# Patient Record
Sex: MALE | Race: WHITE | Employment: OTHER | URBAN - METROPOLITAN AREA
[De-identification: names, ages, dates, MRNs, and addresses within clinical notes are randomized per-mention and may not be internally consistent; named-entity substitution may affect disease eponyms.]

---

## 2023-05-09 RX ORDER — LYSINE 500 MG
TABLET ORAL
COMMUNITY

## 2023-05-09 RX ORDER — HYDROXYCHLOROQUINE SULFATE 200 MG/1
200 TABLET, FILM COATED ORAL DAILY
COMMUNITY
Start: 2023-04-04

## 2023-05-09 RX ORDER — LANOLIN ALCOHOL/MO/W.PET/CERES
400 CREAM (GRAM) TOPICAL 2 TIMES DAILY
COMMUNITY
Start: 2022-10-09

## 2023-05-18 ENCOUNTER — HOSPITAL ENCOUNTER (OUTPATIENT)
Age: 73
Setting detail: OBSERVATION
Discharge: HOME | End: 2023-05-19
Attending: UROLOGY | Admitting: UROLOGY
Payer: MEDICARE

## 2023-05-18 ENCOUNTER — ANESTHESIA (OUTPATIENT)
Dept: OPERATING ROOM | Age: 73
End: 2023-05-18
Payer: MEDICARE

## 2023-05-18 ENCOUNTER — ANESTHESIA EVENT (OUTPATIENT)
Dept: OPERATING ROOM | Age: 73
End: 2023-05-18
Payer: MEDICARE

## 2023-05-18 DIAGNOSIS — R33.9 RETENTION OF URINE, UNSPECIFIED: ICD-10-CM

## 2023-05-18 PROBLEM — N40.1 BPH WITH OBSTRUCTION/LOWER URINARY TRACT SYMPTOMS: Status: ACTIVE | Noted: 2023-05-18

## 2023-05-18 PROBLEM — N13.8 BPH WITH OBSTRUCTION/LOWER URINARY TRACT SYMPTOMS: Status: ACTIVE | Noted: 2023-05-18

## 2023-05-18 PROCEDURE — 2709999900 HC NON-CHARGEABLE SUPPLY: Performed by: UROLOGY

## 2023-05-18 PROCEDURE — 3600000004 HC SURGERY LEVEL 4 BASE: Performed by: UROLOGY

## 2023-05-18 PROCEDURE — 88305 TISSUE EXAM BY PATHOLOGIST: CPT

## 2023-05-18 PROCEDURE — G0378 HOSPITAL OBSERVATION PER HR: HCPCS

## 2023-05-18 PROCEDURE — 3700000001 HC ADD 15 MINUTES (ANESTHESIA): Performed by: UROLOGY

## 2023-05-18 PROCEDURE — 7100000001 HC PACU RECOVERY - ADDTL 15 MIN: Performed by: UROLOGY

## 2023-05-18 PROCEDURE — 2720000010 HC SURG SUPPLY STERILE: Performed by: UROLOGY

## 2023-05-18 PROCEDURE — 2580000003 HC RX 258: Performed by: ANESTHESIOLOGY

## 2023-05-18 PROCEDURE — 2580000003 HC RX 258: Performed by: UROLOGY

## 2023-05-18 PROCEDURE — A4217 STERILE WATER/SALINE, 500 ML: HCPCS | Performed by: UROLOGY

## 2023-05-18 PROCEDURE — 6370000000 HC RX 637 (ALT 250 FOR IP): Performed by: UROLOGY

## 2023-05-18 PROCEDURE — 3600000014 HC SURGERY LEVEL 4 ADDTL 15MIN: Performed by: UROLOGY

## 2023-05-18 PROCEDURE — 6360000002 HC RX W HCPCS: Performed by: UROLOGY

## 2023-05-18 PROCEDURE — 7100000000 HC PACU RECOVERY - FIRST 15 MIN: Performed by: UROLOGY

## 2023-05-18 PROCEDURE — 3700000000 HC ANESTHESIA ATTENDED CARE: Performed by: UROLOGY

## 2023-05-18 PROCEDURE — 6360000002 HC RX W HCPCS: Performed by: NURSE ANESTHETIST, CERTIFIED REGISTERED

## 2023-05-18 RX ORDER — MIDAZOLAM HYDROCHLORIDE 1 MG/ML
2 INJECTION INTRAMUSCULAR; INTRAVENOUS
Status: DISCONTINUED | OUTPATIENT
Start: 2023-05-18 | End: 2023-05-18 | Stop reason: HOSPADM

## 2023-05-18 RX ORDER — SODIUM CHLORIDE, SODIUM LACTATE, POTASSIUM CHLORIDE, CALCIUM CHLORIDE 600; 310; 30; 20 MG/100ML; MG/100ML; MG/100ML; MG/100ML
INJECTION, SOLUTION INTRAVENOUS CONTINUOUS
Status: DISCONTINUED | OUTPATIENT
Start: 2023-05-18 | End: 2023-05-19 | Stop reason: HOSPADM

## 2023-05-18 RX ORDER — SODIUM CHLORIDE 0.9 % (FLUSH) 0.9 %
5-40 SYRINGE (ML) INJECTION EVERY 12 HOURS SCHEDULED
Status: DISCONTINUED | OUTPATIENT
Start: 2023-05-18 | End: 2023-05-18 | Stop reason: HOSPADM

## 2023-05-18 RX ORDER — SODIUM CHLORIDE 0.9 % (FLUSH) 0.9 %
5-40 SYRINGE (ML) INJECTION PRN
Status: DISCONTINUED | OUTPATIENT
Start: 2023-05-18 | End: 2023-05-18 | Stop reason: HOSPADM

## 2023-05-18 RX ORDER — SODIUM CHLORIDE 0.9 % (FLUSH) 0.9 %
5-40 SYRINGE (ML) INJECTION EVERY 12 HOURS SCHEDULED
Status: DISCONTINUED | OUTPATIENT
Start: 2023-05-18 | End: 2023-05-19 | Stop reason: HOSPADM

## 2023-05-18 RX ORDER — CHOLECALCIFEROL (VITAMIN D3) 1250 MCG
CAPSULE ORAL 2 TIMES DAILY
COMMUNITY

## 2023-05-18 RX ORDER — IPRATROPIUM BROMIDE AND ALBUTEROL SULFATE 2.5; .5 MG/3ML; MG/3ML
1 SOLUTION RESPIRATORY (INHALATION)
Status: DISCONTINUED | OUTPATIENT
Start: 2023-05-18 | End: 2023-05-18 | Stop reason: HOSPADM

## 2023-05-18 RX ORDER — ONDANSETRON 2 MG/ML
4 INJECTION INTRAMUSCULAR; INTRAVENOUS
Status: DISCONTINUED | OUTPATIENT
Start: 2023-05-18 | End: 2023-05-18 | Stop reason: HOSPADM

## 2023-05-18 RX ORDER — FENTANYL CITRATE 50 UG/ML
50 INJECTION, SOLUTION INTRAMUSCULAR; INTRAVENOUS EVERY 5 MIN PRN
Status: DISCONTINUED | OUTPATIENT
Start: 2023-05-18 | End: 2023-05-18 | Stop reason: HOSPADM

## 2023-05-18 RX ORDER — ONDANSETRON 2 MG/ML
4 INJECTION INTRAMUSCULAR; INTRAVENOUS EVERY 6 HOURS PRN
Status: DISCONTINUED | OUTPATIENT
Start: 2023-05-18 | End: 2023-05-19 | Stop reason: HOSPADM

## 2023-05-18 RX ORDER — LANOLIN ALCOHOL/MO/W.PET/CERES
400 CREAM (GRAM) TOPICAL 2 TIMES DAILY
Status: DISCONTINUED | OUTPATIENT
Start: 2023-05-18 | End: 2023-05-19 | Stop reason: HOSPADM

## 2023-05-18 RX ORDER — ACETAMINOPHEN 325 MG/1
650 TABLET ORAL EVERY 4 HOURS PRN
Status: DISCONTINUED | OUTPATIENT
Start: 2023-05-18 | End: 2023-05-19 | Stop reason: HOSPADM

## 2023-05-18 RX ORDER — ONDANSETRON 4 MG/1
4 TABLET, ORALLY DISINTEGRATING ORAL EVERY 8 HOURS PRN
Status: DISCONTINUED | OUTPATIENT
Start: 2023-05-18 | End: 2023-05-19 | Stop reason: HOSPADM

## 2023-05-18 RX ORDER — HYDROXYCHLOROQUINE SULFATE 200 MG/1
200 TABLET, FILM COATED ORAL DAILY
Status: DISCONTINUED | OUTPATIENT
Start: 2023-05-19 | End: 2023-05-19 | Stop reason: HOSPADM

## 2023-05-18 RX ORDER — DEXAMETHASONE SODIUM PHOSPHATE 4 MG/ML
INJECTION, SOLUTION INTRA-ARTICULAR; INTRALESIONAL; INTRAMUSCULAR; INTRAVENOUS; SOFT TISSUE PRN
Status: DISCONTINUED | OUTPATIENT
Start: 2023-05-18 | End: 2023-05-18 | Stop reason: SDUPTHER

## 2023-05-18 RX ORDER — SODIUM CHLORIDE 9 MG/ML
INJECTION, SOLUTION INTRAVENOUS PRN
Status: DISCONTINUED | OUTPATIENT
Start: 2023-05-18 | End: 2023-05-19 | Stop reason: HOSPADM

## 2023-05-18 RX ORDER — MAGNESIUM HYDROXIDE 1200 MG/15ML
LIQUID ORAL CONTINUOUS PRN
Status: COMPLETED | OUTPATIENT
Start: 2023-05-18 | End: 2023-05-18

## 2023-05-18 RX ORDER — SODIUM CHLORIDE, SODIUM LACTATE, POTASSIUM CHLORIDE, CALCIUM CHLORIDE 600; 310; 30; 20 MG/100ML; MG/100ML; MG/100ML; MG/100ML
INJECTION, SOLUTION INTRAVENOUS CONTINUOUS
Status: DISCONTINUED | OUTPATIENT
Start: 2023-05-18 | End: 2023-05-18 | Stop reason: HOSPADM

## 2023-05-18 RX ORDER — MORPHINE SULFATE 2 MG/ML
4 INJECTION, SOLUTION INTRAMUSCULAR; INTRAVENOUS
Status: DISCONTINUED | OUTPATIENT
Start: 2023-05-18 | End: 2023-05-19 | Stop reason: HOSPADM

## 2023-05-18 RX ORDER — BISACODYL 10 MG
10 SUPPOSITORY, RECTAL RECTAL DAILY PRN
Status: DISCONTINUED | OUTPATIENT
Start: 2023-05-18 | End: 2023-05-19 | Stop reason: HOSPADM

## 2023-05-18 RX ORDER — OXYCODONE HYDROCHLORIDE 5 MG/1
5 TABLET ORAL EVERY 4 HOURS PRN
Status: DISCONTINUED | OUTPATIENT
Start: 2023-05-18 | End: 2023-05-19 | Stop reason: HOSPADM

## 2023-05-18 RX ORDER — FENTANYL CITRATE 50 UG/ML
INJECTION, SOLUTION INTRAMUSCULAR; INTRAVENOUS PRN
Status: DISCONTINUED | OUTPATIENT
Start: 2023-05-18 | End: 2023-05-18 | Stop reason: SDUPTHER

## 2023-05-18 RX ORDER — LANOLIN ALCOHOL/MO/W.PET/CERES
1000 CREAM (GRAM) TOPICAL 2 TIMES DAILY
Status: DISCONTINUED | OUTPATIENT
Start: 2023-05-18 | End: 2023-05-19 | Stop reason: HOSPADM

## 2023-05-18 RX ORDER — SODIUM CHLORIDE 0.9 % (FLUSH) 0.9 %
5-40 SYRINGE (ML) INJECTION PRN
Status: DISCONTINUED | OUTPATIENT
Start: 2023-05-18 | End: 2023-05-19 | Stop reason: HOSPADM

## 2023-05-18 RX ORDER — METOCLOPRAMIDE HYDROCHLORIDE 5 MG/ML
10 INJECTION INTRAMUSCULAR; INTRAVENOUS
Status: DISCONTINUED | OUTPATIENT
Start: 2023-05-18 | End: 2023-05-18 | Stop reason: HOSPADM

## 2023-05-18 RX ORDER — ONDANSETRON 2 MG/ML
INJECTION INTRAMUSCULAR; INTRAVENOUS PRN
Status: DISCONTINUED | OUTPATIENT
Start: 2023-05-18 | End: 2023-05-18 | Stop reason: SDUPTHER

## 2023-05-18 RX ORDER — LIDOCAINE HYDROCHLORIDE 20 MG/ML
INJECTION, SOLUTION INTRAVENOUS PRN
Status: DISCONTINUED | OUTPATIENT
Start: 2023-05-18 | End: 2023-05-18 | Stop reason: SDUPTHER

## 2023-05-18 RX ORDER — SODIUM CHLORIDE 9 MG/ML
25 INJECTION, SOLUTION INTRAVENOUS PRN
Status: DISCONTINUED | OUTPATIENT
Start: 2023-05-18 | End: 2023-05-18 | Stop reason: HOSPADM

## 2023-05-18 RX ORDER — MORPHINE SULFATE 2 MG/ML
2 INJECTION, SOLUTION INTRAMUSCULAR; INTRAVENOUS
Status: DISCONTINUED | OUTPATIENT
Start: 2023-05-18 | End: 2023-05-19 | Stop reason: HOSPADM

## 2023-05-18 RX ORDER — PROPOFOL 10 MG/ML
INJECTION, EMULSION INTRAVENOUS PRN
Status: DISCONTINUED | OUTPATIENT
Start: 2023-05-18 | End: 2023-05-18 | Stop reason: SDUPTHER

## 2023-05-18 RX ORDER — CIPROFLOXACIN 2 MG/ML
400 INJECTION, SOLUTION INTRAVENOUS
Status: COMPLETED | OUTPATIENT
Start: 2023-05-18 | End: 2023-05-18

## 2023-05-18 RX ORDER — POLYETHYLENE GLYCOL 3350 17 G/17G
17 POWDER, FOR SOLUTION ORAL DAILY
Status: DISCONTINUED | OUTPATIENT
Start: 2023-05-18 | End: 2023-05-19 | Stop reason: HOSPADM

## 2023-05-18 RX ORDER — OXYCODONE HYDROCHLORIDE 5 MG/1
10 TABLET ORAL EVERY 4 HOURS PRN
Status: DISCONTINUED | OUTPATIENT
Start: 2023-05-18 | End: 2023-05-19 | Stop reason: HOSPADM

## 2023-05-18 RX ORDER — LIDOCAINE HYDROCHLORIDE 10 MG/ML
1 INJECTION, SOLUTION EPIDURAL; INFILTRATION; INTRACAUDAL; PERINEURAL
Status: DISCONTINUED | OUTPATIENT
Start: 2023-05-18 | End: 2023-05-18 | Stop reason: HOSPADM

## 2023-05-18 RX ORDER — BISACODYL 5 MG/1
5 TABLET, DELAYED RELEASE ORAL DAILY PRN
Status: DISCONTINUED | OUTPATIENT
Start: 2023-05-18 | End: 2023-05-19 | Stop reason: HOSPADM

## 2023-05-18 RX ORDER — LABETALOL HYDROCHLORIDE 5 MG/ML
10 INJECTION, SOLUTION INTRAVENOUS
Status: DISCONTINUED | OUTPATIENT
Start: 2023-05-18 | End: 2023-05-18 | Stop reason: HOSPADM

## 2023-05-18 RX ADMIN — FENTANYL CITRATE 100 MCG: 50 INJECTION, SOLUTION INTRAMUSCULAR; INTRAVENOUS at 12:00

## 2023-05-18 RX ADMIN — CYANOCOBALAMIN TAB 1000 MCG 1000 MCG: 1000 TAB at 20:06

## 2023-05-18 RX ADMIN — PROPOFOL 200 MG: 10 INJECTION, EMULSION INTRAVENOUS at 12:00

## 2023-05-18 RX ADMIN — PHENYLEPHRINE HYDROCHLORIDE 100 MCG: 10 INJECTION, SOLUTION INTRAMUSCULAR; INTRAVENOUS; SUBCUTANEOUS at 12:42

## 2023-05-18 RX ADMIN — SODIUM CHLORIDE, POTASSIUM CHLORIDE, SODIUM LACTATE AND CALCIUM CHLORIDE: 600; 310; 30; 20 INJECTION, SOLUTION INTRAVENOUS at 10:25

## 2023-05-18 RX ADMIN — FOLIC ACID TAB 400 MCG 400 MCG: 400 TAB at 20:06

## 2023-05-18 RX ADMIN — CIPROFLOXACIN 400 MG: 400 INJECTION, SOLUTION INTRAVENOUS at 12:05

## 2023-05-18 RX ADMIN — LIDOCAINE HYDROCHLORIDE 100 MG: 20 INJECTION INTRAVENOUS at 12:00

## 2023-05-18 RX ADMIN — PHENYLEPHRINE HYDROCHLORIDE 50 MCG: 10 INJECTION, SOLUTION INTRAMUSCULAR; INTRAVENOUS; SUBCUTANEOUS at 12:17

## 2023-05-18 RX ADMIN — ONDANSETRON 4 MG: 2 INJECTION INTRAMUSCULAR; INTRAVENOUS at 12:52

## 2023-05-18 RX ADMIN — ACETAMINOPHEN 650 MG: 325 TABLET ORAL at 21:35

## 2023-05-18 RX ADMIN — PHENYLEPHRINE HYDROCHLORIDE 100 MCG: 10 INJECTION, SOLUTION INTRAMUSCULAR; INTRAVENOUS; SUBCUTANEOUS at 12:49

## 2023-05-18 RX ADMIN — DEXAMETHASONE SODIUM PHOSPHATE 4 MG: 4 INJECTION, SOLUTION INTRAMUSCULAR; INTRAVENOUS at 12:52

## 2023-05-18 ASSESSMENT — PAIN DESCRIPTION - FREQUENCY: FREQUENCY: INTERMITTENT

## 2023-05-18 ASSESSMENT — PAIN DESCRIPTION - DESCRIPTORS: DESCRIPTORS: POUNDING

## 2023-05-18 ASSESSMENT — PAIN DESCRIPTION - ORIENTATION: ORIENTATION: OTHER (COMMENT)

## 2023-05-18 ASSESSMENT — PAIN SCALES - GENERAL: PAINLEVEL_OUTOF10: 3

## 2023-05-18 ASSESSMENT — PAIN DESCRIPTION - PAIN TYPE
TYPE: ACUTE PAIN
TYPE: ACUTE PAIN;SURGICAL PAIN

## 2023-05-18 ASSESSMENT — PAIN DESCRIPTION - ONSET: ONSET: GRADUAL

## 2023-05-18 ASSESSMENT — LIFESTYLE VARIABLES: SMOKING_STATUS: 0

## 2023-05-18 ASSESSMENT — PAIN DESCRIPTION - LOCATION: LOCATION: HEAD

## 2023-05-18 ASSESSMENT — PAIN - FUNCTIONAL ASSESSMENT
PAIN_FUNCTIONAL_ASSESSMENT: 0-10
PAIN_FUNCTIONAL_ASSESSMENT: PREVENTS OR INTERFERES SOME ACTIVE ACTIVITIES AND ADLS

## 2023-05-18 NOTE — PROGRESS NOTES
PACU Transfer to Floor Note    Procedure(s):  CYSTOSCOPY,  BIPOLAR TRANSURETHRAL RESECTION OF PROSTATE    Current Allergies: Patient has no known allergies. Pt meets criteria as per Piero Score and ASPAN Standards to transfer to next phase of care. No results for input(s): POCGLU in the last 72 hours. Vitals:    05/18/23 1415   BP: 129/62   Pulse: 77   Resp: 19   Temp: 97.4 °F (36.3 °C)   SpO2: 100%      SpO2: 100 %    O2 Flow Rate (L/min): 0 L/min      Intake/Output Summary (Last 24 hours) at 5/18/2023 1424  Last data filed at 5/18/2023 1415  Gross per 24 hour   Intake 620 ml   Output 105 ml   Net 515 ml       Pain assessment:  none    Pain Level: 0    Patient was assessed for alterations to skin integrity. There were no alterations observed.     Handoff report given at bedside to RN for [address]  Family (wife) updated and directed to pt room      5/18/2023 2:24 PM

## 2023-05-18 NOTE — BRIEF OP NOTE
Brief Postoperative Note      Patient: Ahsan Buenrostro  YOB: 1950  MRN: [id number]    Date of Procedure: 5/18/2023    Pre-Op Diagnosis Codes:     * Retention of urine, unspecified [R33.9]    Post-Op Diagnosis: Same       Procedure(s):  CYSTOSCOPY,  BIPOLAR TRANSURETHRAL RESECTION OF PROSTATE    Surgeon(s):  Vel Lockwood MD    Assistant:  * No surgical staff found *    Anesthesia: General    Estimated Blood Loss (mL): Minimal    Complications: None    Specimens:   * No specimens in log *    Implants:  * No implants in log *      Drains:   Urinary Catheter 05/18/23 Carrillo (Active)       Findings: small, but obstructing gland. Severe bladder trabeculations    URINE IS CRYSTAL CLEAR. WILL SEND TO PACU ON CBI. IF THIS CAN BE WEANED, HE MIGHT BE ABLE TO BE DISCHARGED WITH CARRILLO CATHETER.       Electronically signed by Chuck Clark MD on 5/18/2023 at 12:52 PM

## 2023-05-18 NOTE — H&P
Urology Attending Admission Note      Reason for Admission: retention, bph    History: 66 yo with bph, luts, on isc     Meds: see med rec  Family History, Social History, Review of Systems:  Reviewed and agreed to as per chart    Exam:    Vitals:  BP (!) 151/80   Pulse 75   Temp 98 °F (36.7 °C) (Temporal)   Resp 18   Ht 5' 8.75\" (1.746 m)   Wt 176 lb 6.4 oz (80 kg)   SpO2 97%   BMI 26.24 kg/m²   Temp  Av °F (36.7 °C)  Min: 98 °F (36.7 °C)  Max: 98 °F (36.7 °C)  No intake or output data in the 24 hours ending 23 1152    Physical:   Well developed, well nourished in no acute distress  Mood indicates no abnormalities. Pt doesnt appear depressed  Orientated to time and place  Neck is supple, trachea is midline  Respiratory effort is normal  Cardiovascular show no extremity swelling  Abdomen no masses or hernias are palpated, there is no tenderness. Liver and Spleen appear normal.  Skin show no abnormal lesions  Lymph nodes are not palpated in the inguinal, neck, or axillary area. Male :  Penis appears normal and circumcised  Urethral meatus is normal in size and location  Scrotum appears normal and both testicles appear normal in size and location  Sphincter has good tone  Anus is inspected. There are no perineal masses  Prostate is wnl, no tenderness and no induration  Seminal Vesicles are not palpable      Labs:  WBC:  No results found for: WBC  Hemoglobin/Hematocrit:  No results found for: HGB, HCT  BMP:  No results found for: NA, K, CL, CO2, BUN, LABALBU, CREATININE, CALCIUM, GFRAA, LABGLOM  PT/INR:  No results found for: PROTIME, INR  PTT:  No results found for: APTT[APTT    Urinalysis: wnl    Imaging:  n/a    Impression/Plan: 66 yo with aur after hip surgery on isc  -cysto, possible turp.     Tony Pickens MD

## 2023-05-18 NOTE — ANESTHESIA PRE PROCEDURE
anesthetic complications (h/o epidural for sx with subsequent atonic bladder and need for catheterizations): Airway: Mallampati: II  TM distance: >3 FB   Neck ROM: full  Mouth opening: > = 3 FB   Dental: normal exam         Pulmonary: breath sounds clear to auscultation      (-) COPD, asthma, sleep apnea and not a current smoker                           Cardiovascular:        (-) hypertension, past MI, CAD, CABG/stent, dysrhythmias,  angina,  CHF and orthopnea      Rhythm: regular  Rate: normal           Beta Blocker:  Not on Beta Blocker         Neuro/Psych:      (-) seizures, TIA and CVA           GI/Hepatic/Renal:        (-) GERD, hepatitis, liver disease and no renal disease       Endo/Other:    (+) : arthritis: rheumatoid. , no malignancy/cancer. (-) diabetes mellitus, hypothyroidism, hyperthyroidism, no malignancy/cancer               Abdominal:             Vascular:     - DVT and PE. Other Findings:           Anesthesia Plan      general     ASA 2       Induction: intravenous. MIPS: Prophylactic antiemetics administered. Anesthetic plan and risks discussed with patient. Plan discussed with CRNA.                     Yovanny Witt MD   5/18/2023

## 2023-05-18 NOTE — ANESTHESIA POSTPROCEDURE EVALUATION
Department of Anesthesiology  Postprocedure Note    Patient: Frank Curtis  MRN: [id number]  YOB: 1950  Date of evaluation: 5/18/2023      Procedure Summary     Date: 05/18/23 Room / Location: [address] [place] [address]    Anesthesia Start: 3544 Anesthesia Stop: 6261    Procedure: CYSTOSCOPY,  BIPOLAR TRANSURETHRAL RESECTION OF PROSTATE Diagnosis:       Retention of urine, unspecified      (Retention of urine, unspecified [R33.9])    Surgeons: Ashlie Porter MD Responsible Provider: Willi Benjamin MD    Anesthesia Type: General ASA Status: 2          Anesthesia Type: General    Piero Phase I: Piero Score: 9    Piero Phase II:        Anesthesia Post Evaluation    Patient location during evaluation: PACU  Level of consciousness: awake and alert  Airway patency: patent  Nausea & Vomiting: no vomiting  Complications: no  Cardiovascular status: blood pressure returned to baseline  Respiratory status: acceptable  Hydration status: euvolemic  Multimodal analgesia pain management approach

## 2023-05-18 NOTE — PROGRESS NOTES
Patient admitted to PACU # 3 from OR at 1300 post CYSTOSCOPY, [address] per Alpa Conti MD.  Attached to PACU monitoring system and report received from anesthesia provider. Patient was reported to be hemodynamically stable during procedure. Patient drowsy on admission and denied pain. Pt arrived to PACU with 3-way oconnor and CBI. This RN changed oconnor bag from 2L to 3L and applied stat lock.

## 2023-05-19 VITALS
RESPIRATION RATE: 18 BRPM | HEART RATE: 83 BPM | OXYGEN SATURATION: 98 % | BODY MASS INDEX: 26.13 KG/M2 | WEIGHT: 176.4 LBS | TEMPERATURE: 98.5 F | HEIGHT: 69 IN | DIASTOLIC BLOOD PRESSURE: 68 MMHG | SYSTOLIC BLOOD PRESSURE: 148 MMHG

## 2023-05-19 LAB
ANION GAP SERPL CALCULATED.3IONS-SCNC: 9 MMOL/L (ref 3–16)
BUN SERPL-MCNC: 15 MG/DL (ref 7–20)
CALCIUM SERPL-MCNC: 9.1 MG/DL (ref 8.3–10.6)
CHLORIDE SERPL-SCNC: 105 MMOL/L (ref 99–110)
CO2 SERPL-SCNC: 26 MMOL/L (ref 21–32)
CREAT SERPL-MCNC: 0.8 MG/DL (ref 0.8–1.3)
DEPRECATED RDW RBC AUTO: 15.2 % (ref 12.4–15.4)
GFR SERPLBLD CREATININE-BSD FMLA CKD-EPI: >60 ML/MIN/{1.73_M2}
GLUCOSE SERPL-MCNC: 109 MG/DL (ref 70–99)
HCT VFR BLD AUTO: 34.2 % (ref 40.5–52.5)
HGB BLD-MCNC: 11.2 G/DL (ref 13.5–17.5)
MCH RBC QN AUTO: 31.2 PG (ref 26–34)
MCHC RBC AUTO-ENTMCNC: 32.8 G/DL (ref 31–36)
MCV RBC AUTO: 94.9 FL (ref 80–100)
PLATELET # BLD AUTO: 228 K/UL (ref 135–450)
PMV BLD AUTO: 7.9 FL (ref 5–10.5)
POTASSIUM SERPL-SCNC: 4 MMOL/L (ref 3.5–5.1)
RBC # BLD AUTO: 3.61 M/UL (ref 4.2–5.9)
SODIUM SERPL-SCNC: 140 MMOL/L (ref 136–145)
WBC # BLD AUTO: 7.6 K/UL (ref 4–11)

## 2023-05-19 PROCEDURE — 2580000003 HC RX 258: Performed by: UROLOGY

## 2023-05-19 PROCEDURE — 36415 COLL VENOUS BLD VENIPUNCTURE: CPT

## 2023-05-19 PROCEDURE — 51798 US URINE CAPACITY MEASURE: CPT

## 2023-05-19 PROCEDURE — 6370000000 HC RX 637 (ALT 250 FOR IP): Performed by: UROLOGY

## 2023-05-19 PROCEDURE — 80048 BASIC METABOLIC PNL TOTAL CA: CPT

## 2023-05-19 PROCEDURE — G0378 HOSPITAL OBSERVATION PER HR: HCPCS

## 2023-05-19 PROCEDURE — 85027 COMPLETE CBC AUTOMATED: CPT

## 2023-05-19 RX ADMIN — ACETAMINOPHEN 650 MG: 325 TABLET ORAL at 07:52

## 2023-05-19 RX ADMIN — POLYETHYLENE GLYCOL (3350) 17 G: 17 POWDER, FOR SOLUTION ORAL at 08:01

## 2023-05-19 RX ADMIN — CYANOCOBALAMIN TAB 1000 MCG 1000 MCG: 1000 TAB at 07:52

## 2023-05-19 RX ADMIN — FOLIC ACID TAB 400 MCG 400 MCG: 400 TAB at 08:01

## 2023-05-19 RX ADMIN — HYDROXYCHLOROQUINE SULFATE 200 MG: 200 TABLET ORAL at 07:52

## 2023-05-19 RX ADMIN — SODIUM CHLORIDE, POTASSIUM CHLORIDE, SODIUM LACTATE AND CALCIUM CHLORIDE: 600; 310; 30; 20 INJECTION, SOLUTION INTRAVENOUS at 04:54

## 2023-05-19 ASSESSMENT — PAIN SCALES - GENERAL
PAINLEVEL_OUTOF10: 5
PAINLEVEL_OUTOF10: 4

## 2023-05-19 ASSESSMENT — PAIN DESCRIPTION - ORIENTATION: ORIENTATION: RIGHT

## 2023-05-19 ASSESSMENT — PAIN DESCRIPTION - LOCATION: LOCATION: HEAD

## 2023-05-19 ASSESSMENT — PAIN DESCRIPTION - DESCRIPTORS: DESCRIPTORS: THROBBING

## 2023-05-19 NOTE — DISCHARGE SUMMARY
Urology Discharge Summary      Patient Identification  Emilio Teixeira is a 67 y.o. male. :  1950  Admit Date:  2023    Discharge date: 23                                  Disposition: home    Discharge Diagnoses:   Patient Active Problem List   Diagnosis    BPH with obstruction/lower urinary tract symptoms       Surgery: TURP    Activity:  no lifting or Strenuous exercise for 3-4 weeks     Condition on discharge: Stable     Follow-up: 3-4 weeks     Hospital course: Patient was admitted to [facility] for procedure as stated above. Surgery went as planned with no complications. Patient was kept overnight for observation. Vora catheter was removed in the morning and patient was discharged in stable condition. He will follow back in 3-4 weeks for post-op check with [place] [facility]

## 2023-05-19 NOTE — PROGRESS NOTES
Discharge orders placed. Discharge paperwork and medication reviewed with patient and family. All questions answered per RN. IV and tele monitor removed without difficulty. Patient discharged from 5S via wheelchair to home.

## 2023-05-19 NOTE — CARE COORDINATION
12:25 PM  Met with patient at bedside, pt is from home, IPTA, no current home services. Pt denies a need for any. Pt has transport at time of dc. CM will sign off at this time. Please consult CM/SW if any dcp needs arise.     Electronically signed by Melanie Quevedo RN, CM on 5/19/2023 at 12:25 PM.  Phone: [phone]  Fax: [phone]

## 2023-05-19 NOTE — OP NOTE
[facility] [address]                                OPERATIVE REPORT    PATIENT NAME: Clarice Johnston                     :        1950  MED REC NO:   [id number]                          ROOM:       7981  ACCOUNT NO:   [de-identified]                           ADMIT DATE: 2023  PROVIDER:     Nilay Zuleta MD    DATE OF PROCEDURE:  2023    PREOPERATIVE DIAGNOSIS:  BPH with obstruction and chronic retention. POSTOPERATIVE DIAGNOSIS:  BPH with obstruction and chronic retention. PROCEDURE PERFORMED:  Cystoscopy and bipolar transurethral resection of  prostate. SURGEON:  Nilay Zuleta MD    INDICATION FOR PROCEDURE:  The patient is a very pleasant 19-year-old  gentleman that had a hip surgery and ever since has been in retention. He is doing intermittent self-catheterization. We have been able to get  them down to about twice a day, but he still has significant lower  urinary tract symptoms in between catheterizations. He has been  extensively counseled on risks and benefits of prostate surgery  including TURP, UroLift, etc.  He presents today for cystoscopy and  possible bipolar transurethral resection of prostate tissue. DESCRIPTION OF PROCEDURE:  After undergoing informed consent, the  patient was taken to the operating room. He was prepped and draped in  sterile fashion and given a dose of preoperative antibiotics. Under  general anesthetic, I insert the rigid scope into the penis. There are  no urethral strictures. The prostate has kissing lobes bilaterally, it  is probably a 30 gm gland, overall small for its level of symptoms. Now, upon entering the bladder, I see severe bladder trabeculations with  signs consistent with bladder outlet obstruction. I worry that he may  have a partially neurogenic bladder.   However, at this time, due to the  obstructive nature of the prostate, I think

## 2023-05-19 NOTE — DISCHARGE INSTRUCTIONS
Please call [phone] ([facility]) with any questions/concerns. It is normal to see blood in the urine for up to 6 weeks following procedure. Please avoid heavy lifting/strenuous exercise for 3-4 weeks. If you are unable to urinate for an extended period of time please call the office. We will see you in 1 month for post-op check.

## 2023-05-19 NOTE — PROGRESS NOTES
Verbal placed for oconnor catheter removal. Oconnor removed at 1006.  Patient denied pain and/or discomfort during removal. Urinal placed at patient bedside and patient educated on the importance of urinating post removal.

## 2023-05-19 NOTE — PROGRESS NOTES
Patient alert and oriented. VSS Patient c/o headache, Tylenol given. CBI in place. Patient ambulates in agee and tolerates well. Patient in bed lowest position call light and bedside table within reach. All needs are met at this time. Patient aware to call if any help needed. Will continue to monitor  BP (!) 143/69   Pulse 79   Temp 98.2 °F (36.8 °C) (Oral)   Resp 18   Ht 5' 8.75\" (1.746 m)   Wt 176 lb 6.4 oz (80 kg)   SpO2 95%   BMI 26.24 kg/m²

## 2023-05-19 NOTE — PROGRESS NOTES
Urology Attending Progress Note      Subjective: No complaints, feels fine. Vitals:  BP (!) 150/76   Pulse 79   Temp 98 °F (36.7 °C) (Oral)   Resp 16   Ht 5' 8.75\" (1.746 m)   Wt 176 lb 6.4 oz (80 kg)   SpO2 97%   BMI 26.24 kg/m²   Temp  Av.7 °F (36.5 °C)  Min: 97.1 °F (36.2 °C)  Max: 98.2 °F (36.8 °C)    Intake/Output Summary (Last 24 hours) at 2023 0922  Last data filed at 2023 0507  Gross per 24 hour   Intake 620 ml   Output -645 ml   Net 1265 ml       Exam: Urine crystal clear on low CBI    Labs:  WBC:    Lab Results   Component Value Date/Time    WBC 7.6 2023 06:24 AM     Hemoglobin/Hematocrit:    Lab Results   Component Value Date/Time    HGB 11.2 2023 06:24 AM    HCT 34.2 2023 06:24 AM     BMP:    Lab Results   Component Value Date/Time     2023 06:24 AM    K 4.0 2023 06:24 AM     2023 06:24 AM    CO2 26 2023 06:24 AM    BUN 15 2023 06:24 AM    CREATININE 0.8 2023 06:24 AM    CALCIUM 9.1 2023 06:24 AM    LABGLOM >60 2023 06:24 AM     PT/INR:  No results found for: PROTIME, INR  PTT:  No results found for: APTT[APTT    Impression/Plan: 68 yo M with history of retention needing ISC, POD#1 sp TURP. -Overall doing great. No complaints  -Urine crystal clear on low CBI. I clamped on rounds.  Can remove if urine remains clear for voiding trial. PVR check after first void  -Labs/vitals fine  -DC home later today pending VT    [place] [facility]

## (undated) DEVICE — GOWN,SIRUS,POLYRNF,BRTHSLV,XL,30/CS: Brand: MEDLINE

## (undated) DEVICE — CATHETER URETH 22FR 30CC BLLN F 3 W SPEC M RND TIP TWO

## (undated) DEVICE — HF-RESECTION ELECTRODE PLASMALOOP LOOP, LARGE, 24 FR., 12°/16°, ESG TURIS: Brand: OLYMPUS

## (undated) DEVICE — 60 ML SYRINGE,CATHETER TIP: Brand: MONOJECT

## (undated) DEVICE — SOLUTION IRRIG 3000ML 0.9% SOD CHL USP UROMATIC PLAS CONT

## (undated) DEVICE — BASIC SINGLE BASIN 1-LF: Brand: MEDLINE INDUSTRIES, INC.

## (undated) DEVICE — SOLUTION ST WATER 1500ML W/H

## (undated) DEVICE — GARMENT,MEDLINE,DVT,INT,CALF,MED, GEN2: Brand: MEDLINE

## (undated) DEVICE — GLOVE ORANGE PI 7   MSG9070

## (undated) DEVICE — SYRINGE MED 30ML STD CLR PLAS LUERLOCK TIP N CTRL DISP

## (undated) DEVICE — TOWEL,STOP FLAG GOLD N-W: Brand: MEDLINE

## (undated) DEVICE — DRAINBAG,ANTI-REFLUX TOWER,L/F,2000ML,LL: Brand: MEDLINE

## (undated) DEVICE — CYSTO: Brand: MEDLINE INDUSTRIES, INC.